# Patient Record
(demographics unavailable — no encounter records)

---

## 2025-04-18 NOTE — ASSESSMENT
[FreeTextEntry1] : 86 year old, Para 2-0-1-2 ( x2, Miscarriage x1). Patient had a TLH/BSO in 2015 at [facility] for clear cell carcinoma of the uterus. She was treated for chemotherapy x6, whole pelvic radiation followed by brachytherapy. She remains with NEDZ, she denies any vaginal bleeding, discharge or pain. She is excited about a destination wedding in [place].

## 2025-04-18 NOTE — PHYSICAL EXAM
[Chaperoned Physical Exam] : A chaperone was present in the examining room during all aspects of the physical examination. [MA] : MA [FreeTextEntry2] : Roslyn Ricketts [TextEntry] : Well - developed, well-nourished female in no acute distress HEENT - wnl Breasts - symmetrical w/o masses or nipple discharge Abdomen - soft, non-tender, +BS Incisions - well healed Back - no CVA tenderness or spinal tenderness Extremities - w/o clubbing, cyanosis, no lesions noted Neuro - AAOx3  Pelvic Exam External Genitalia - with out lesions Vagina - mucosa atrophic, cuff intact and well supported. Cervix - surgically absent Uterus - surgically absent Adnexa - surgically absent Rectovaginal - confirmatory

## 2025-04-18 NOTE — HISTORY OF PRESENT ILLNESS
[FreeTextEntry1] : 86 year old, Para 2-0-1-2 ( x2, Miscarriage x1). Patient had a TLH/BSO in 2015 at [facility] for clear cell carcinoma of the uterus. She was treated for chemotherapy x6, whole pelvic radiation followed by brachytherapy. She remains with NEDZ, she denies any vaginal bleeding, discharge or pain.  Last seen in GYN ONC office 24.  Presenting today for annual health maintenance visit.

## 2025-04-18 NOTE — HISTORY OF PRESENT ILLNESS
[FreeTextEntry1] : 86 year old, Para 2-0-1-2 ( x2, Miscarriage x1). Patient had a TLH/BSO in 2015 at [facility] for clear cell carcinoma of the uterus. She was treated for chemotherapy x6, whole pelvic radiation followed by brachytherapy. She remains with NEDZ, she denies any vaginal bleeding, discharge or pain.  Last seen in GYN ONC office 24.  Presenting today for annual health maintenance visit.

## 2025-04-18 NOTE — ASSESSMENT
[FreeTextEntry1] : 86 year old, Para 2-0-1-2 ( x2, Miscarriage x1). Patient had a TLH/BSO in 2015 at [facility] for clear cell carcinoma of the uterus. She was treated for chemotherapy x6, whole pelvic radiation followed by brachytherapy. She remains with NEDZ, she denies any vaginal bleeding, discharge or pain. She is excited about a destination wedding in [place].